# Patient Record
(demographics unavailable — no encounter records)

---

## 2019-08-06 PROBLEM — L70.0 ACNE VULGARIS: Status: ACTIVE | Noted: 2019-08-06

## 2019-08-14 PROCEDURE — 87491 CHLMYD TRACH DNA AMP PROBE: CPT | Performed by: FAMILY MEDICINE

## 2019-08-14 PROCEDURE — 87591 N.GONORRHOEAE DNA AMP PROB: CPT | Performed by: FAMILY MEDICINE

## 2019-08-14 PROCEDURE — 88175 CYTOPATH C/V AUTO FLUID REDO: CPT | Performed by: FAMILY MEDICINE

## 2025-04-15 NOTE — ED PROVIDER NOTES
Patient Seen in: [facility] Emergency Department      History     Chief Complaint   Patient presents with   • Chest Pain     Stated Complaint: chest pain    Subjective:   HPI    27-year-old female presenting with chest pain.  Patient said this afternoon started noticing some chest pain.  Says sharp discomfort that is worse with taking deep breath but lower chest as that seems to be all throughout her chest no.  She has had no recent fevers chills cough cold runny nose or any other exacerbating factors.  She recently took a 6-hour trip.  She denies any other exacerbating relieving factors  History of Present Illness               Objective:     History reviewed. No pertinent past medical history.           History reviewed. No pertinent surgical history.             Social History     Socioeconomic History   • Marital status:    Tobacco Use   • Smoking status: Never     Passive exposure: Never   • Smokeless tobacco: Never   Vaping Use   • Vaping status: Never Used   Substance and Sexual Activity   • Alcohol use: Yes     Comment: occ   • Drug use: Never                                Physical Exam     ED Triage Vitals [04/14/25 2214]   /86   Pulse 69   Resp 18   Temp 97.7 °F (36.5 °C)   Temp src Temporal   SpO2 99 %   O2 Device None (Room air)       Current Vitals:   Vital Signs  BP: 122/85  Pulse: 76  Resp: 15  Temp: 97.7 °F (36.5 °C)  Temp src: Temporal  MAP (mmHg): 97    Oxygen Therapy  SpO2: 100 %  O2 Device: None (Room air)        Physical Exam  Awake alert patient appears no distress HEENT exam is normal lungs were clear cardiovascular exam regular rhythm abdomen soft nontender extremities no Cyanosis or edema no rash no focal neurologic deficits  Physical Exam                ED Course     Labs Reviewed   COMP METABOLIC PANEL (14) - Abnormal; Notable for the following components:       Result Value    Potassium 3.3 (*)     All other components within normal limits   TROPONIN I HIGH  SENSITIVITY - Normal   D-DIMER - Normal   POCT PREGNANCY URINE - Normal   CBC WITH DIFFERENTIAL WITH PLATELET   RAINBOW DRAW LAVENDER   RAINBOW DRAW LIGHT GREEN   RAINBOW DRAW BLUE     EKG    Rate, intervals and axes as noted on EKG Report.  Rate: 71  Rhythm: Sinus Rhythm  Reading: No areas of acute ST segment elevation or depression           ED Course as of 04/15/25 0307  ------------------------------------------------------------  Time: 04/15 0207  Value: POCT Pregnancy, Urine  Comment: Unremarkable  ------------------------------------------------------------  Time: 04/15 0208  Value: Comp Metabolic Panel (14)(!)  Comment: Slight hypokalemia otherwise normal  ------------------------------------------------------------  Time: 04/15 0208  Value: CBC With Differential With Platelet  Comment: Unremarkable  ------------------------------------------------------------  Time: 04/15 0208  Value: Troponin I (High Sensitivity)  Comment: Unremarkable  ------------------------------------------------------------  Time: 04/15 0208  Value: D-Dimer  Comment: Unremarkable  ------------------------------------------------------------  Time: 04/15 0306  Value: XR CHEST AP PORTABLE  (CPT=71045)  Comment: Independent interpretation by ED physician shows atelectasis     Results            Differential diagnosis includes pulmonary embolism, acute coronary syndrome                     MDM              Medical Decision Making  27-year-old female presenting to the emergency department for chest pain IV established cardiac monitor shows sinus rhythm pulse ox shows no signs hide hypoxia laboratory tests are within acceptable limits.  Laboratory tests are within acceptable is independent interpretation by ED physician chest x-ray shows atelectasis versus infiltrate but the patient has had no constitutional symptoms of infection so we will hold off antibiotics at this time patient was given Toradol with improvement of her symptoms but no  resolution she is to return to the emergency department for worsening symptoms send is to follow-up with primary care doctor for further questions  The patient was screened and evaluated during this visit.  As a treating physician attending to the patient, I determined, within reasonable clinical confidence and prior to discharge, that an emergency medical condition was not or was no longer present.  There was no indication for further evaluation, treatment or admission on an emergency basis.    The usual and customary discharge instructions were discussed given the patient's ER course.  We discussed signs and symptoms that should prompt the patient's immediate return to the emergency department.  Reasonable over-the-counter and prescription treatment options and physician follow-up plan was discussed.  Patient was discharged home in good condition  This note was prepared using Dragon Medical voice recognition dictation software.  As a result errors may occur.  When identified to these areas have been corrected.  While every attempt is made to correct errors during dictation discrepancies may still exist.  Please contact if there are any errors    Problems Addressed:  Acute chest pain: acute illness or injury    Amount and/or Complexity of Data Reviewed  Labs: ordered. Decision-making details documented in ED Course.  Radiology: ordered and independent interpretation performed. Decision-making details documented in ED Course.  ECG/medicine tests: ordered and independent interpretation performed. Decision-making details documented in ED Course.        Disposition and Plan     Clinical Impression:  1. Acute chest pain         Disposition:  Discharge  4/15/2025  3:07 am    Follow-up:  Herber Amado MD  [id number]  [facility] [address]  [phone]    Follow up in 1 week(s)            Medications Prescribed:  There are no discharge medications for this patient.      Supplementary Documentation:

## 2025-04-15 NOTE — ED INITIAL ASSESSMENT (HPI)
Pt to ER sent from immediate care with c/o mid CP that started today. Pt states \"hurts to take a deep breath\"  Pt seen at immediate care and directed her for further eval.